# Patient Record
Sex: MALE | Race: WHITE | Employment: FULL TIME | URBAN - METROPOLITAN AREA
[De-identification: names, ages, dates, MRNs, and addresses within clinical notes are randomized per-mention and may not be internally consistent; named-entity substitution may affect disease eponyms.]

---

## 2020-02-11 ENCOUNTER — HOSPITAL ENCOUNTER (INPATIENT)
Age: 50
LOS: 2 days | Discharge: HOME | DRG: 309 | End: 2020-02-13
Attending: EMERGENCY MEDICINE | Admitting: INTERNAL MEDICINE
Payer: COMMERCIAL

## 2020-02-11 ENCOUNTER — APPOINTMENT (OUTPATIENT)
Dept: GENERAL RADIOLOGY | Age: 50
DRG: 309 | End: 2020-02-11
Payer: COMMERCIAL

## 2020-02-11 PROBLEM — F10.10 ALCOHOL ABUSE: Status: ACTIVE | Noted: 2020-02-11

## 2020-02-11 PROBLEM — I48.91 NEW ONSET A-FIB (HCC): Status: ACTIVE | Noted: 2020-02-11

## 2020-02-11 LAB
A/G RATIO: 1.4 (ref 1.1–2.2)
ALBUMIN SERPL-MCNC: 4.2 G/DL (ref 3.4–5)
ALP BLD-CCNC: 84 U/L (ref 40–129)
ALT SERPL-CCNC: 31 U/L (ref 10–40)
ANION GAP SERPL CALCULATED.3IONS-SCNC: 14 MMOL/L (ref 3–16)
AST SERPL-CCNC: 21 U/L (ref 15–37)
BILIRUB SERPL-MCNC: 0.7 MG/DL (ref 0–1)
BUN BLDV-MCNC: 12 MG/DL (ref 7–20)
CALCIUM SERPL-MCNC: 9.4 MG/DL (ref 8.3–10.6)
CHLORIDE BLD-SCNC: 97 MMOL/L (ref 99–110)
CO2: 25 MMOL/L (ref 21–32)
CREAT SERPL-MCNC: 0.8 MG/DL (ref 0.9–1.3)
ETHANOL: NORMAL MG/DL (ref 0–0.08)
GFR AFRICAN AMERICAN: >60
GFR NON-AFRICAN AMERICAN: >60
GLOBULIN: 3 G/DL
GLUCOSE BLD-MCNC: 357 MG/DL (ref 70–99)
HCT VFR BLD CALC: 51.8 % (ref 40.5–52.5)
HEMOGLOBIN: 18 G/DL (ref 13.5–17.5)
MAGNESIUM: 1.9 MG/DL (ref 1.8–2.4)
MCH RBC QN AUTO: 30 PG (ref 26–34)
MCHC RBC AUTO-ENTMCNC: 34.7 G/DL (ref 31–36)
MCV RBC AUTO: 86.5 FL (ref 80–100)
PDW BLD-RTO: 12.5 % (ref 12.4–15.4)
PLATELET # BLD: 271 K/UL (ref 135–450)
PMV BLD AUTO: 8.4 FL (ref 5–10.5)
POTASSIUM SERPL-SCNC: 4.1 MMOL/L (ref 3.5–5.1)
RBC # BLD: 5.99 M/UL (ref 4.2–5.9)
SODIUM BLD-SCNC: 136 MMOL/L (ref 136–145)
TOTAL PROTEIN: 7.2 G/DL (ref 6.4–8.2)
TROPONIN: <0.01 NG/ML
TSH REFLEX: 1.83 UIU/ML (ref 0.27–4.2)
WBC # BLD: 8.6 K/UL (ref 4–11)

## 2020-02-11 PROCEDURE — 6370000000 HC RX 637 (ALT 250 FOR IP): Performed by: INTERNAL MEDICINE

## 2020-02-11 PROCEDURE — 1200000000 HC SEMI PRIVATE

## 2020-02-11 PROCEDURE — 96365 THER/PROPH/DIAG IV INF INIT: CPT

## 2020-02-11 PROCEDURE — G0480 DRUG TEST DEF 1-7 CLASSES: HCPCS

## 2020-02-11 PROCEDURE — 2580000003 HC RX 258: Performed by: INTERNAL MEDICINE

## 2020-02-11 PROCEDURE — 85027 COMPLETE CBC AUTOMATED: CPT

## 2020-02-11 PROCEDURE — 80053 COMPREHEN METABOLIC PANEL: CPT

## 2020-02-11 PROCEDURE — 99291 CRITICAL CARE FIRST HOUR: CPT

## 2020-02-11 PROCEDURE — 84443 ASSAY THYROID STIM HORMONE: CPT

## 2020-02-11 PROCEDURE — 83735 ASSAY OF MAGNESIUM: CPT

## 2020-02-11 PROCEDURE — 96376 TX/PRO/DX INJ SAME DRUG ADON: CPT

## 2020-02-11 PROCEDURE — 2580000003 HC RX 258: Performed by: EMERGENCY MEDICINE

## 2020-02-11 PROCEDURE — 6360000002 HC RX W HCPCS: Performed by: INTERNAL MEDICINE

## 2020-02-11 PROCEDURE — 2500000003 HC RX 250 WO HCPCS: Performed by: INTERNAL MEDICINE

## 2020-02-11 PROCEDURE — 36415 COLL VENOUS BLD VENIPUNCTURE: CPT

## 2020-02-11 PROCEDURE — 84484 ASSAY OF TROPONIN QUANT: CPT

## 2020-02-11 PROCEDURE — 2500000003 HC RX 250 WO HCPCS: Performed by: EMERGENCY MEDICINE

## 2020-02-11 PROCEDURE — 71046 X-RAY EXAM CHEST 2 VIEWS: CPT

## 2020-02-11 PROCEDURE — 93005 ELECTROCARDIOGRAM TRACING: CPT | Performed by: EMERGENCY MEDICINE

## 2020-02-11 RX ORDER — ASPIRIN 81 MG/1
81 TABLET, CHEWABLE ORAL DAILY
Status: DISCONTINUED | OUTPATIENT
Start: 2020-02-11 | End: 2020-02-12

## 2020-02-11 RX ORDER — 0.9 % SODIUM CHLORIDE 0.9 %
1000 INTRAVENOUS SOLUTION INTRAVENOUS ONCE
Status: COMPLETED | OUTPATIENT
Start: 2020-02-11 | End: 2020-02-11

## 2020-02-11 RX ORDER — M-VIT,TX,IRON,MINS/CALC/FOLIC 27MG-0.4MG
1 TABLET ORAL DAILY
COMMUNITY

## 2020-02-11 RX ORDER — SODIUM CHLORIDE 9 MG/ML
INJECTION, SOLUTION INTRAVENOUS CONTINUOUS
Status: DISCONTINUED | OUTPATIENT
Start: 2020-02-11 | End: 2020-02-11

## 2020-02-11 RX ORDER — OMEPRAZOLE 10 MG/1
10 CAPSULE, DELAYED RELEASE ORAL DAILY
COMMUNITY

## 2020-02-11 RX ORDER — CHLORDIAZEPOXIDE HYDROCHLORIDE 5 MG/1
10 CAPSULE, GELATIN COATED ORAL 3 TIMES DAILY
Status: DISCONTINUED | OUTPATIENT
Start: 2020-02-11 | End: 2020-02-13 | Stop reason: HOSPADM

## 2020-02-11 RX ORDER — CHLORAL HYDRATE 500 MG
3000 CAPSULE ORAL 3 TIMES DAILY
COMMUNITY

## 2020-02-11 RX ORDER — SODIUM CHLORIDE 0.9 % (FLUSH) 0.9 %
10 SYRINGE (ML) INJECTION PRN
Status: DISCONTINUED | OUTPATIENT
Start: 2020-02-11 | End: 2020-02-13 | Stop reason: HOSPADM

## 2020-02-11 RX ORDER — SODIUM CHLORIDE 0.9 % (FLUSH) 0.9 %
10 SYRINGE (ML) INJECTION EVERY 12 HOURS SCHEDULED
Status: DISCONTINUED | OUTPATIENT
Start: 2020-02-11 | End: 2020-02-13 | Stop reason: HOSPADM

## 2020-02-11 RX ORDER — ONDANSETRON 2 MG/ML
4 INJECTION INTRAMUSCULAR; INTRAVENOUS EVERY 6 HOURS PRN
Status: DISCONTINUED | OUTPATIENT
Start: 2020-02-11 | End: 2020-02-13 | Stop reason: HOSPADM

## 2020-02-11 RX ORDER — ACETAMINOPHEN 325 MG/1
650 TABLET ORAL EVERY 4 HOURS PRN
Status: DISCONTINUED | OUTPATIENT
Start: 2020-02-11 | End: 2020-02-13 | Stop reason: HOSPADM

## 2020-02-11 RX ORDER — DILTIAZEM HYDROCHLORIDE 5 MG/ML
15 INJECTION INTRAVENOUS ONCE
Status: COMPLETED | OUTPATIENT
Start: 2020-02-11 | End: 2020-02-11

## 2020-02-11 RX ADMIN — ASPIRIN 81 MG 81 MG: 81 TABLET ORAL at 21:36

## 2020-02-11 RX ADMIN — SODIUM CHLORIDE 1000 ML: 9 INJECTION, SOLUTION INTRAVENOUS at 14:45

## 2020-02-11 RX ADMIN — DILTIAZEM HYDROCHLORIDE 5 MG/HR: 5 INJECTION INTRAVENOUS at 15:39

## 2020-02-11 RX ADMIN — CHLORDIAZEPOXIDE HYDROCHLORIDE 10 MG: 5 CAPSULE ORAL at 21:36

## 2020-02-11 RX ADMIN — DILTIAZEM HYDROCHLORIDE 15 MG: 5 INJECTION INTRAVENOUS at 14:25

## 2020-02-11 RX ADMIN — Medication 10 ML: at 21:55

## 2020-02-11 RX ADMIN — FOLIC ACID: 5 INJECTION, SOLUTION INTRAMUSCULAR; INTRAVENOUS; SUBCUTANEOUS at 21:36

## 2020-02-11 RX ADMIN — ENOXAPARIN SODIUM 40 MG: 40 INJECTION SUBCUTANEOUS at 21:36

## 2020-02-11 NOTE — ED PROVIDER NOTES
HISTORY     Past Medical History:   Diagnosis Date    GERD (gastroesophageal reflux disease)          SURGICAL HISTORY     History reviewed. No pertinent surgical history. CURRENT MEDICATIONS       Current Discharge Medication List      CONTINUE these medications which have NOT CHANGED    Details   Omega-3 Fatty Acids (FISH OIL) 1000 MG CAPS Take 3,000 mg by mouth 3 times daily      Multiple Vitamins-Minerals (THERAPEUTIC MULTIVITAMIN-MINERALS) tablet Take 1 tablet by mouth daily      omeprazole (PRILOSEC) 10 MG delayed release capsule Take 10 mg by mouth daily             ALLERGIES     Pcn [penicillins]    FAMILY HISTORY     History reviewed. No pertinent family history.        SOCIAL HISTORY       Social History     Socioeconomic History    Marital status:      Spouse name: None    Number of children: None    Years of education: None    Highest education level: None   Occupational History    None   Social Needs    Financial resource strain: None    Food insecurity:     Worry: None     Inability: None    Transportation needs:     Medical: None     Non-medical: None   Tobacco Use    Smoking status: Never Smoker    Smokeless tobacco: Never Used   Substance and Sexual Activity    Alcohol use: Yes     Comment: 5-6 drinks/day    Drug use: None    Sexual activity: None   Lifestyle    Physical activity:     Days per week: None     Minutes per session: None    Stress: None   Relationships    Social connections:     Talks on phone: None     Gets together: None     Attends [employer] service: None     Active member of club or organization: None     Attends meetings of clubs or organizations: None     Relationship status: None    Intimate partner violence:     Fear of current or ex partner: None     Emotionally abused: None     Physically abused: None     Forced sexual activity: None   Other Topics Concern    None   Social History Narrative    None       SCREENINGS    Francisco Coma Scale  Eye signs or Co-signs this chart in the absence of a cardiologist.    The Ekg interpreted by me shows  atrial fibrillation with a rate of 165  Axis is   Normal  QTc is  407  Intervals and Durations are unremarkable. ST Segments: normal  No prior ekg    RADIOLOGY:   Non-plain film images such as CT, Ultrasound and MRI are read by the radiologist. Plain radiographic images are visualized and preliminarily interpreted by the emergency physician with the below findings:      Interpretation per the Radiologist below, if available at the time of this note:    XR CHEST STANDARD (2 VW)   Final Result   No acute cardiopulmonary disease.                ED BEDSIDE ULTRASOUND:   Performed by ED Physician - none    LABS:  Labs Reviewed   CBC - Abnormal; Notable for the following components:       Result Value    RBC 5.99 (*)     Hemoglobin 18.0 (*)     All other components within normal limits    Narrative:     Performed at:  [place] [address] [address]   Phone [phone]   COMPREHENSIVE METABOLIC PANEL - Abnormal; Notable for the following components:    Chloride 97 (*)     Glucose 357 (*)     CREATININE 0.8 (*)     All other components within normal limits    Narrative:     Performed at:  [place] [address] [address]   Phone [phone]   TROPONIN    Narrative:     Performed at:  [place] [address] [address]   Phone [phone]   ETHANOL    Narrative:     Performed at:  [place] [address] [address]   Phone [phone]   TROPONIN    Narrative:     Performed at:  [place] [address] [address]   Phone [phone]   TSH WITH REFLEX    Narrative:     Performed at:  [facility]  [address] [place] [id number]   Phone [phone]   MAGNESIUM    Narrative:     Performed at:  [facility]  [address] [place] [address],  [place], [facility] Brian [address]ado   Phone [phone]   TROPONIN   PROTIME-INR   BASIC METABOLIC PANEL   MAGNESIUM   HEMOGLOBIN A1C       All other labs were within normal range ornot returned as of this dictation. EMERGENCY DEPARTMENT COURSE and DIFFERENTIAL DIAGNOSIS/MDM:   Vitals:    Vitals:    02/12/20 0033 02/12/20 0045 02/12/20 0059 02/12/20 0116   BP: 114/82 123/80 (!) 126/90 119/81   Pulse: 80 91 81 75   Resp: 18 18 18 18   Temp: 98.5 °F (36.9 °C)      TempSrc: Oral      SpO2: 95% 96% 96% 95%   Weight:       Height:             MDM    ED COURSE/MDM    -Skylar Bernstein is a 47 y.o. male with no significant medical history presents to ED for palpitations. Seen by PCP today and was told to go to the ED as he had new onset A. fib on EKG. -On arrival patient tachycardic in the 160s though has been no complaints at this time.  -Patient seen and evaluated. Oldrecords reviewed. Workup was significant for elevated glucose of 357.  -This x-ray shows no acute cardiopulmonary disease  -Was given 15 mg of diltiazem with improvement to 130s. Was then put on an infusion.  -Labs and imaging reviewed and results discussed with patient.  -Plan for admission for further workup and treatment discussed with patient and family for new onset afib. Patient and family in agreement with plan and have nofurther questions/concerns      REASSESSMENT      Well appearing, non toxic, alert, oriented speaking in full sentences and hemodynamically stable upon discharge      CRITICAL CARE TIME   Total Critical Care time was 30 minutes, excluding separately reportableprocedures. There was a high probability of clinicallysignificant/life threatening deterioration in the patient's condition which required my urgent intervention.       CONSULTS:  IP CONSULT TO HOSPITALIST  IP CONSULT TO CARDIOLOGY    PROCEDURES:  Unless otherwise noted below, none     Procedures    FINAL IMPRESSION      1.  New onset a-fib [facility])          DISPOSITION/PLAN   DISPOSITION Admitted 02/11/2020 04:56:49 PM      PATIENT REFERREDTO:  Zaheer Landon MD  [address] [place] [address]  [phone]            DISCHARGE MEDICATIONS:  Current Discharge Medication List             (Please note that portions of this note were completed with a voice recognition program.  Efforts were made to edit the dictations but occasionally wordsare mis-transcribed.)    Anila Márquez MD (electronically signed)  Attending Emergency Physician            Anila Márquez MD  02/12/20 9470

## 2020-02-11 NOTE — ED NOTES
Pt to er with fluttery feeling in his chest.. feels like his heart is racing. . pt states he drinks 5-6 healthy size alcoholic beverages each night. On Thursday he drank a few more than usual and began having heart palpitations, he has tried to cut back on the drinking and caffinated beverages for the last few days and symptoms didn't resolve. He saw pcp today and he showed rapid heart rate and was told to come to er to be evaluated.  Lungs are clear bowel x4, pt denies nausea vomiting or David Astudillo, RN  02/11/20 5326

## 2020-02-12 LAB
ANION GAP SERPL CALCULATED.3IONS-SCNC: 10 MMOL/L (ref 3–16)
BUN BLDV-MCNC: 11 MG/DL (ref 7–20)
CALCIUM SERPL-MCNC: 8.7 MG/DL (ref 8.3–10.6)
CHLORIDE BLD-SCNC: 103 MMOL/L (ref 99–110)
CO2: 26 MMOL/L (ref 21–32)
CREAT SERPL-MCNC: 0.7 MG/DL (ref 0.9–1.3)
EKG ATRIAL RATE: 156 BPM
EKG DIAGNOSIS: NORMAL
EKG Q-T INTERVAL: 246 MS
EKG QRS DURATION: 82 MS
EKG QTC CALCULATION (BAZETT): 407 MS
EKG R AXIS: -17 DEGREES
EKG T AXIS: 34 DEGREES
EKG VENTRICULAR RATE: 165 BPM
ESTIMATED AVERAGE GLUCOSE: 306.3 MG/DL
GFR AFRICAN AMERICAN: >60
GFR NON-AFRICAN AMERICAN: >60
GLUCOSE BLD-MCNC: 304 MG/DL (ref 70–99)
GLUCOSE BLD-MCNC: 312 MG/DL (ref 70–99)
GLUCOSE BLD-MCNC: 331 MG/DL (ref 70–99)
GLUCOSE BLD-MCNC: 347 MG/DL (ref 70–99)
GLUCOSE BLD-MCNC: 356 MG/DL (ref 70–99)
HBA1C MFR BLD: 12.3 %
INR BLD: 1.07 (ref 0.86–1.14)
MAGNESIUM: 2 MG/DL (ref 1.8–2.4)
PERFORMED ON: ABNORMAL
POTASSIUM SERPL-SCNC: 4 MMOL/L (ref 3.5–5.1)
PROTHROMBIN TIME: 12.4 SEC (ref 10–13.2)
SODIUM BLD-SCNC: 139 MMOL/L (ref 136–145)
TROPONIN: <0.01 NG/ML

## 2020-02-12 PROCEDURE — 93010 ELECTROCARDIOGRAM REPORT: CPT | Performed by: INTERNAL MEDICINE

## 2020-02-12 PROCEDURE — 6370000000 HC RX 637 (ALT 250 FOR IP): Performed by: INTERNAL MEDICINE

## 2020-02-12 PROCEDURE — 1200000000 HC SEMI PRIVATE

## 2020-02-12 PROCEDURE — 83735 ASSAY OF MAGNESIUM: CPT

## 2020-02-12 PROCEDURE — 6360000002 HC RX W HCPCS: Performed by: INTERNAL MEDICINE

## 2020-02-12 PROCEDURE — 99255 IP/OBS CONSLTJ NEW/EST HI 80: CPT | Performed by: INTERNAL MEDICINE

## 2020-02-12 PROCEDURE — 80048 BASIC METABOLIC PNL TOTAL CA: CPT

## 2020-02-12 PROCEDURE — 2580000003 HC RX 258: Performed by: INTERNAL MEDICINE

## 2020-02-12 PROCEDURE — 83036 HEMOGLOBIN GLYCOSYLATED A1C: CPT

## 2020-02-12 PROCEDURE — 85610 PROTHROMBIN TIME: CPT

## 2020-02-12 PROCEDURE — 36415 COLL VENOUS BLD VENIPUNCTURE: CPT

## 2020-02-12 PROCEDURE — 2500000003 HC RX 250 WO HCPCS: Performed by: INTERNAL MEDICINE

## 2020-02-12 PROCEDURE — 6370000000 HC RX 637 (ALT 250 FOR IP): Performed by: REGISTERED NURSE

## 2020-02-12 RX ORDER — DEXTROSE MONOHYDRATE 50 MG/ML
100 INJECTION, SOLUTION INTRAVENOUS PRN
Status: DISCONTINUED | OUTPATIENT
Start: 2020-02-12 | End: 2020-02-13 | Stop reason: HOSPADM

## 2020-02-12 RX ORDER — GLIPIZIDE 5 MG/1
5 TABLET ORAL
Status: DISCONTINUED | OUTPATIENT
Start: 2020-02-13 | End: 2020-02-13 | Stop reason: HOSPADM

## 2020-02-12 RX ORDER — DEXTROSE MONOHYDRATE 25 G/50ML
12.5 INJECTION, SOLUTION INTRAVENOUS PRN
Status: DISCONTINUED | OUTPATIENT
Start: 2020-02-12 | End: 2020-02-13 | Stop reason: HOSPADM

## 2020-02-12 RX ORDER — NICOTINE POLACRILEX 4 MG
15 LOZENGE BUCCAL PRN
Status: DISCONTINUED | OUTPATIENT
Start: 2020-02-12 | End: 2020-02-13 | Stop reason: HOSPADM

## 2020-02-12 RX ORDER — METOPROLOL SUCCINATE 25 MG/1
25 TABLET, EXTENDED RELEASE ORAL EVERY 6 HOURS
Status: DISCONTINUED | OUTPATIENT
Start: 2020-02-12 | End: 2020-02-13

## 2020-02-12 RX ADMIN — APIXABAN 5 MG: 5 TABLET, FILM COATED ORAL at 12:37

## 2020-02-12 RX ADMIN — INSULIN LISPRO 6 UNITS: 100 INJECTION, SOLUTION INTRAVENOUS; SUBCUTANEOUS at 23:19

## 2020-02-12 RX ADMIN — INSULIN LISPRO 12 UNITS: 100 INJECTION, SOLUTION INTRAVENOUS; SUBCUTANEOUS at 19:12

## 2020-02-12 RX ADMIN — FOLIC ACID: 5 INJECTION, SOLUTION INTRAMUSCULAR; INTRAVENOUS; SUBCUTANEOUS at 23:14

## 2020-02-12 RX ADMIN — METOPROLOL SUCCINATE 25 MG: 25 TABLET, EXTENDED RELEASE ORAL at 23:20

## 2020-02-12 RX ADMIN — CHLORDIAZEPOXIDE HYDROCHLORIDE 10 MG: 5 CAPSULE ORAL at 14:22

## 2020-02-12 RX ADMIN — DILTIAZEM HYDROCHLORIDE 10 MG/HR: 5 INJECTION INTRAVENOUS at 14:22

## 2020-02-12 RX ADMIN — INSULIN LISPRO 5 UNITS: 100 INJECTION, SOLUTION INTRAVENOUS; SUBCUTANEOUS at 14:22

## 2020-02-12 RX ADMIN — DILTIAZEM HYDROCHLORIDE 10 MG/HR: 5 INJECTION INTRAVENOUS at 00:33

## 2020-02-12 RX ADMIN — Medication 10 ML: at 09:21

## 2020-02-12 RX ADMIN — METOPROLOL SUCCINATE 25 MG: 25 TABLET, EXTENDED RELEASE ORAL at 11:25

## 2020-02-12 RX ADMIN — APIXABAN 5 MG: 5 TABLET, FILM COATED ORAL at 20:23

## 2020-02-12 RX ADMIN — CHLORDIAZEPOXIDE HYDROCHLORIDE 10 MG: 5 CAPSULE ORAL at 20:23

## 2020-02-12 RX ADMIN — CHLORDIAZEPOXIDE HYDROCHLORIDE 10 MG: 5 CAPSULE ORAL at 09:20

## 2020-02-12 RX ADMIN — DILTIAZEM HYDROCHLORIDE 10 MG/HR: 5 INJECTION INTRAVENOUS at 23:25

## 2020-02-12 RX ADMIN — Medication 10 ML: at 20:23

## 2020-02-12 RX ADMIN — ASPIRIN 81 MG 81 MG: 81 TABLET ORAL at 09:20

## 2020-02-12 RX ADMIN — METOPROLOL SUCCINATE 25 MG: 25 TABLET, EXTENDED RELEASE ORAL at 16:24

## 2020-02-12 ASSESSMENT — PAIN SCALES - GENERAL
PAINLEVEL_OUTOF10: 0

## 2020-02-12 ASSESSMENT — ENCOUNTER SYMPTOMS
TROUBLE SWALLOWING: 0
NAUSEA: 0
COUGH: 0
RHINORRHEA: 0
DIARRHEA: 0
VOMITING: 0
SORE THROAT: 0
CHEST TIGHTNESS: 0
SHORTNESS OF BREATH: 0
CONSTIPATION: 0
BACK PAIN: 0
ABDOMINAL PAIN: 0

## 2020-02-12 NOTE — PLAN OF CARE
Assess BP and HR q 4 hours and prn. Educate pt on disease process, management, s/s to notify provider and risks associated with atrial fibrillation.

## 2020-02-12 NOTE — CARE COORDINATION
Writer met with pt to confirm he has no discharge needs. Pt lives at home with family, has [place], has PCP, works full time. Pt declined wanting any substance abuse resources, states he was waiting for a wake up call to stop drinking. Pt denies any discharge needs.   MINE Garcia-RN

## 2020-02-12 NOTE — H&P
insight  Capillary Refill: Brisk,< 3 seconds   Peripheral Pulses: +2 palpable, equal bilaterally       Labs:     Recent Labs     02/11/20  1350   WBC 8.6   HGB 18.0*   HCT 51.8        Recent Labs     02/11/20  1350      K 4.1   CL 97*   CO2 25   BUN 12   CREATININE 0.8*   CALCIUM 9.4     Recent Labs     02/11/20  1350   AST 21   ALT 31   BILITOT 0.7   ALKPHOS 84     No results for input(s): INR in the last 72 hours. Recent Labs     02/11/20  1350   TROPONINI <0.01       Radiology:     CXR: I have reviewed the CXR with the following interpretation: No acute process  EKG:  I have reviewed the EKG with the following interpretation: Atrial fibrillation, no acute ischemic changes, no prior EKGs to compare    XR CHEST STANDARD (2 VW)   Final Result   No acute cardiopulmonary disease. ASSESSMENT:  PLAN:    New onset a-fib   Likely due to alcoholism -alcohol binge last week. And excess caffeine intake.  -Rate controlled with Cardizem bolus and gtt. Initiated, titrated   -Monitor on telemetry  -Alcohol cessation counseling given  -2D echo, TSH, cardiology input  -Aspirin 81 mg daily  -Likely not a candidate for anticoagulation as this is A. fib secondary to alcoholism which usually abates with alcohol cessation    Chronic alcoholism with mild withdrawal  -Cessation counseling given, patient motivated to quit for good  -Rally pack x3 doses  -Librium 10 mg 3 times daily scheduled for mild withdrawal symptoms  -Monitor for severe withdrawal [still in window for DTs] and initiate CIWA protocol if need be    Hyperglycemia  - on arrival  -Check A1c  -Low-dose SSI    DVT Prophylaxis: lmwh  Diet: DIET GENERAL;  Code Status: Full Code    PT/OT Eval Status: na    Dispo - IP stay       Vern Wells MD    Thank you Evangelina Hernandez MD for the opportunity to be involved in this patient's care. If you have any questions or concerns please feel free to contact me at [phone].

## 2020-02-12 NOTE — CONSULTS
Nutrition Education    Type and Reason for Visit: Consult, Patient Education(DM diet education)    Nutrition Assessment:     Consult received for DM diet education. Noted hemoglobin A1c of 12.3% on 2/12. Pt reports that he was told in 2016 that his blood sugars were high (A1c was 7.9 at that time) and he tried to make some changes, but recently has not been \"being good\" d/t the holidays. Provided pt with written and verbal instruction on carb counting, label reading, and meal planning. Discussed limiting carb intakes to 4-5 carb choices per meal. Pt voiced understanding and reports that he is familiar with reading labels and what foods contain carbohydrates. MD to start pt on low dose SSI ACHS and glipizide. Changed diet order to carb control (5 carb choices/meal). Will continue to monitor per [facility] for any nutritional or education needs. · Verbally reviewed information with Patient  · Written educational materials provided. · Contact name and number provided. · Refer to Patient Education activity for more details.     Electronically signed by Wm Quach RD, MARGA on 2/12/20 at 2:22 PM    Contact Number: Office: [phone]; [address]: [address]

## 2020-02-12 NOTE — CONSULTS
(36.8 °C)   SpO2: 96%        Intake/Output Summary (Last 24 hours) at 2020 1004  Last data filed at 2020 0920  Gross per 24 hour   Intake 1902 ml   Output 0 ml   Net 1902 ml     In: 1902 [P. O.:680; I.V.:1222]  Out: 0    Wt Readings from Last 3 Encounters:   20 241 lb 1.6 oz (109.4 kg)     Temp  Av.3 °F (36.8 °C)  Min: 98.1 °F (36.7 °C)  Max: 98.5 °F (36.9 °C)  Pulse  Av.4  Min: 75  Max: 166  BP  Min: 110/75  Max: 146/104  SpO2  Av.4 %  Min: 94 %  Max: 99 %    · Telemetry: Atrial fibrillation with RVR   · Constitutional: Alert. Oriented to person, place, and time. No distress. · Head: Normocephalic and atraumatic. · Eyes: Conjunctivae normal. EOM are normal.   · Neck: Neck supple. No lymphadenopathy. No rigidity. No JVD present. · Cardiovascular: Irregular rate and rhythm without M/G/R.  · Pulmonary/Chest: Bilateral respiratory sounds present. No respiratory accessory muscle use. · Abdominal: Soft. Normal bowel sounds present. No distension, No tenderness. · Musculoskeletal: No tenderness. No edema    · Lymphadenopathy: Has no cervical adenopathy. · Neurological: Alert and oriented. Cranial nerve II-XII grossly intact. · Skin: Skin is warm and dry. No rash, lesions, ulcerations noted. · Psychiatric: No anxiety nor agitation. Labs:  Reviewed. Recent Labs     20  1350 20  0741    139   K 4.1 4.0   CL 97* 103   CO2 25 26   BUN 12 11   CREATININE 0.8* 0.7*     Recent Labs     20  1350   WBC 8.6   HGB 18.0*   HCT 51.8   MCV 86.5        Lab Results   Component Value Date    TROPONINI <0.01 2020     No results found for: BNP  Lab Results   Component Value Date    PROTIME 12.4 2020    INR 1.07 2020     No results found for: CHOL, HDL, TRIG    Diagnostic and imaging results reviewed. ECG: Atrial fibrillation with RVR without signs of ongoing ischemia  Echo: Pending. Cath: None.     I independently reviewed the ECG and telemetry. Scheduled Meds:   insulin lispro  0-6 Units Subcutaneous TID WC    insulin lispro  0-3 Units Subcutaneous Nightly    sodium chloride flush  10 mL Intravenous 2 times per day    aspirin  81 mg Oral Daily    enoxaparin  40 mg Subcutaneous Daily    folic acid, thiamine, multi-vitamin with vitamin K infusion   Intravenous Daily    chlordiazePOXIDE  10 mg Oral TID     Continuous Infusions:   dextrose      diltiazem (CARDIZEM) 125 mg in dextrose 5% 125 mL infusion 10 mg/hr (02/12/20 0835)     PRN Meds:.glucose, dextrose, glucagon (rDNA), dextrose, sodium chloride flush, magnesium hydroxide, ondansetron, perflutren lipid microspheres, acetaminophen     Assessment:   Patient Active Problem List    Diagnosis Date Noted    New onset a-fib ([facility] [address] 02/11/2020    Alcohol abuse 02/11/2020      Active Hospital Problems    Diagnosis Date Noted    New onset a-fib [facility]) [I48.91] 02/11/2020    Alcohol abuse [F10.10] 02/11/2020     Mr. Remy Villavicencio is a pleasant 47year old male with a medical history significant for diet controlled hypertension, and diabetes mellitus type II who presents from home with symptomatic atrial fibrillation with RVR. Problem List:  1. Atrial fibrillation with RVR. 2. Hypertension. 3. Diabetes mellitus type II. Assessment and Plan:  1. Atrial fibrillation with RVR. Mr. Remy Villavicencio is a pleasant 47year old male with a medical history significant for hypertension and diabetes mellitus type II who presents from home with palpitations and has now been found to have atrial fibrillation with RVR. Afib risk factors including age, HTN, obesity, inactivity and FIDELIA were discussed with patient. Risk factor modification recommended. Patient's [id number] score is 2 with a stroke risk of 2.2% per year stroke risk. We discussed oral anticoagulation to decrease the risk of thromboembolic events including stroke. Benefits and alternatives were discussed with patient.  Risk of

## 2020-02-12 NOTE — PROGRESS NOTES
glucose, dextrose, glucagon (rDNA), dextrose, sodium chloride flush, magnesium hydroxide, ondansetron, perflutren lipid microspheres, acetaminophen      Intake/Output Summary (Last 24 hours) at 2/12/2020 1322  Last data filed at 2/12/2020 0920  Gross per 24 hour   Intake 1902 ml   Output 0 ml   Net 1902 ml       Physical Exam Performed:    BP (!) 142/86   Pulse 92   Temp 98.2 °F (36.8 °C) (Oral)   Resp 16   Ht 6' 2\" (1.88 m)   Wt 241 lb 1.6 oz (109.4 kg)   SpO2 97%   BMI 30.96 kg/m²     General appearance: No apparent distress, appears stated age and cooperative. HEENT: Pupils equal, round, and reactive to light. Conjunctivae/corneas clear. Neck: Supple, with full range of motion. No jugular venous distention. Trachea midline. Respiratory:  Normal respiratory effort. Clear to auscultation, bilaterally without Rales/Wheezes/Rhonchi. Cardiovascular: Regular rate and rhythm with normal S1/S2 without murmurs, rubs or gallops. Abdomen: Soft, non-tender, non-distended with normal bowel sounds. Musculoskeletal: No clubbing, cyanosis or edema bilaterally. Full range of motion without deformity. Skin: Skin color, texture, turgor normal.  No rashes or lesions. Neurologic:  Neurovascularly intact without any focal sensory/motor deficits.  Cranial nerves: II-XII intact, grossly non-focal.  Psychiatric: Alert and oriented, thought content appropriate, normal insight  Capillary Refill: Brisk,< 3 seconds   Peripheral Pulses: +2 palpable, equal bilaterally       Labs:   Recent Labs     02/11/20  1350   WBC 8.6   HGB 18.0*   HCT 51.8        Recent Labs     02/11/20  1350 02/12/20  0741    139   K 4.1 4.0   CL 97* 103   CO2 25 26   BUN 12 11   CREATININE 0.8* 0.7*   CALCIUM 9.4 8.7     Recent Labs     02/11/20  1350   AST 21   ALT 31   BILITOT 0.7   ALKPHOS 84     Recent Labs     02/12/20  0741   INR 1.07     Recent Labs     02/11/20  1350 02/11/20  2356   TROPONINI <0.01 <0.01     Radiology:  XR CHEST STANDARD (2 VW)   Final Result   No acute cardiopulmonary disease. Assessment/Plan:    Active Hospital Problems    Diagnosis    New onset a-fib ([facility] [address] [I48.91]    Alcohol abuse [F10.10]       New onset atrial fibrillation:  - Likely due to alcoholism - alcohol binge last week and excess caffeine intake. - Alcohol cessation counseling given. - Pt initiated on cardizem infusion on admission.   - EP/Cardiology consulted -- appreciate. Started pt on metoprolol and apixaban. - Continue to monitor on telemetry.  - TSH WNL.   - ECHO ordered.      Chronic alcoholism with mild withdrawal:  - Cessation counseling given, patient motivated to quit for good. - Rally pack x3 doses. - Continue Librium 10 mg 3 times daily scheduled for mild withdrawal symptoms  - Monitor for severe withdrawal [still in window for DTs] and initiate CIWA protocol if need be.      Hyperglycemia:  - A1C was 7.9 in Sept 2016. Pt reports that he was started on Metformin but was only on it for a month due to GI intolerance/diarrhea. - Repeat A1C is 12.3.  - Will start pt on low dose SSI ACHS and glipizide. - Consult dietician to discuss carb-control diet.        DVT Prophylaxis: Pt is on Eliquis   Diet: DIET GENERAL;  Code Status: Full Code    PT/OT Eval Status: Not indicated     Dispo - 1-2 days     FAM Amado - CNP

## 2020-02-13 VITALS
HEIGHT: 74 IN | WEIGHT: 241.1 LBS | SYSTOLIC BLOOD PRESSURE: 126 MMHG | DIASTOLIC BLOOD PRESSURE: 88 MMHG | OXYGEN SATURATION: 97 % | RESPIRATION RATE: 16 BRPM | BODY MASS INDEX: 30.94 KG/M2 | TEMPERATURE: 98.3 F | HEART RATE: 93 BPM

## 2020-02-13 PROBLEM — I10 ESSENTIAL HYPERTENSION: Status: ACTIVE | Noted: 2020-02-13

## 2020-02-13 LAB
GLUCOSE BLD-MCNC: 238 MG/DL (ref 70–99)
GLUCOSE BLD-MCNC: 277 MG/DL (ref 70–99)
LV EF: 58 %
LVEF MODALITY: NORMAL
PERFORMED ON: ABNORMAL
PERFORMED ON: ABNORMAL

## 2020-02-13 PROCEDURE — 6360000004 HC RX CONTRAST MEDICATION: Performed by: INTERNAL MEDICINE

## 2020-02-13 PROCEDURE — 99233 SBSQ HOSP IP/OBS HIGH 50: CPT | Performed by: NURSE PRACTITIONER

## 2020-02-13 PROCEDURE — 6370000000 HC RX 637 (ALT 250 FOR IP): Performed by: REGISTERED NURSE

## 2020-02-13 PROCEDURE — C8929 TTE W OR WO FOL WCON,DOPPLER: HCPCS

## 2020-02-13 PROCEDURE — 6370000000 HC RX 637 (ALT 250 FOR IP): Performed by: INTERNAL MEDICINE

## 2020-02-13 PROCEDURE — 2580000003 HC RX 258: Performed by: INTERNAL MEDICINE

## 2020-02-13 PROCEDURE — 6370000000 HC RX 637 (ALT 250 FOR IP): Performed by: NURSE PRACTITIONER

## 2020-02-13 RX ORDER — METOPROLOL SUCCINATE 50 MG/1
100 TABLET, EXTENDED RELEASE ORAL DAILY
Qty: 30 TABLET | Refills: 3 | Status: SHIPPED | OUTPATIENT
Start: 2020-02-13 | End: 2020-07-06 | Stop reason: SDUPTHER

## 2020-02-13 RX ORDER — BLOOD-GLUCOSE METER
1 KIT MISCELLANEOUS DAILY
Qty: 1 KIT | Refills: 0 | Status: SHIPPED | OUTPATIENT
Start: 2020-02-13

## 2020-02-13 RX ORDER — METOPROLOL SUCCINATE 50 MG/1
50 TABLET, EXTENDED RELEASE ORAL 2 TIMES DAILY
Status: DISCONTINUED | OUTPATIENT
Start: 2020-02-13 | End: 2020-02-13 | Stop reason: HOSPADM

## 2020-02-13 RX ORDER — METOPROLOL SUCCINATE 25 MG/1
25 TABLET, EXTENDED RELEASE ORAL ONCE
Status: COMPLETED | OUTPATIENT
Start: 2020-02-13 | End: 2020-02-13

## 2020-02-13 RX ORDER — METOPROLOL SUCCINATE 50 MG/1
50 TABLET, EXTENDED RELEASE ORAL 2 TIMES DAILY
Qty: 30 TABLET | Refills: 3 | Status: SHIPPED | OUTPATIENT
Start: 2020-02-13 | End: 2020-02-13

## 2020-02-13 RX ORDER — DILTIAZEM HYDROCHLORIDE 180 MG/1
180 CAPSULE, COATED, EXTENDED RELEASE ORAL DAILY
Qty: 30 CAPSULE | Refills: 3 | Status: SHIPPED | OUTPATIENT
Start: 2020-02-14 | End: 2020-07-06 | Stop reason: SDUPTHER

## 2020-02-13 RX ORDER — DILTIAZEM HYDROCHLORIDE 180 MG/1
180 CAPSULE, COATED, EXTENDED RELEASE ORAL DAILY
Status: DISCONTINUED | OUTPATIENT
Start: 2020-02-13 | End: 2020-02-13 | Stop reason: HOSPADM

## 2020-02-13 RX ORDER — CHLORDIAZEPOXIDE HYDROCHLORIDE 10 MG/1
CAPSULE, GELATIN COATED ORAL
Qty: 9 CAPSULE | Refills: 0 | Status: SHIPPED | OUTPATIENT
Start: 2020-02-13 | End: 2020-02-17

## 2020-02-13 RX ORDER — GLIPIZIDE 5 MG/1
5 TABLET ORAL
Qty: 60 TABLET | Refills: 3 | Status: SHIPPED | OUTPATIENT
Start: 2020-02-14

## 2020-02-13 RX ADMIN — DILTIAZEM HYDROCHLORIDE 180 MG: 180 CAPSULE, COATED, EXTENDED RELEASE ORAL at 11:29

## 2020-02-13 RX ADMIN — CHLORDIAZEPOXIDE HYDROCHLORIDE 10 MG: 5 CAPSULE ORAL at 15:32

## 2020-02-13 RX ADMIN — METOPROLOL SUCCINATE 25 MG: 25 TABLET, EXTENDED RELEASE ORAL at 05:57

## 2020-02-13 RX ADMIN — CHLORDIAZEPOXIDE HYDROCHLORIDE 10 MG: 5 CAPSULE ORAL at 10:13

## 2020-02-13 RX ADMIN — PERFLUTREN 1.65 MG: 6.52 INJECTION, SUSPENSION INTRAVENOUS at 09:30

## 2020-02-13 RX ADMIN — Medication 10 ML: at 10:16

## 2020-02-13 RX ADMIN — INSULIN LISPRO 9 UNITS: 100 INJECTION, SOLUTION INTRAVENOUS; SUBCUTANEOUS at 15:25

## 2020-02-13 RX ADMIN — GLIPIZIDE 5 MG: 5 TABLET ORAL at 05:57

## 2020-02-13 RX ADMIN — APIXABAN 5 MG: 5 TABLET, FILM COATED ORAL at 10:13

## 2020-02-13 RX ADMIN — INSULIN LISPRO 6 UNITS: 100 INJECTION, SOLUTION INTRAVENOUS; SUBCUTANEOUS at 10:13

## 2020-02-13 RX ADMIN — METOPROLOL SUCCINATE 25 MG: 25 TABLET, EXTENDED RELEASE ORAL at 11:29

## 2020-02-13 ASSESSMENT — PAIN SCALES - GENERAL
PAINLEVEL_OUTOF10: 0

## 2020-02-13 NOTE — PLAN OF CARE
Monitor BG levels ACHS and prn. Educate pt and family on disease process; management including medications and diet; s/s of hypoglycemia and hyperglycemia.

## 2020-02-13 NOTE — PROGRESS NOTES
[address]     Electrophysiology                                     Progress Note    Admission date:  2020    Reason for follow up visit: afib    HPI/CC: Gladys Nails was admitted on 2020 with palpitations after consuming a large quantity of bourbon. EKG showed afib with a HR of 165. Echo is pending. Rhythm has been afib with HR . Subjective: He complains of occasional palpitations. Wants to go home. Denies chest pain, shortness of breath, and dizziness. Vitals:  Blood pressure 126/85, pulse 84, temperature 98.1 °F (36.7 °C), temperature source Oral, resp. rate 16, height 6' 2\" (1.88 m), weight 241 lb 1.6 oz (109.4 kg), SpO2 94 %.   Temp  Av.1 °F (36.7 °C)  Min: 97.7 °F (36.5 °C)  Max: 98.4 °F (36.9 °C)  Pulse  Av.7  Min: 84  Max: 130  BP  Min: 124/90  Max: 152/70  SpO2  Av.2 %  Min: 94 %  Max: 97 %    24 hour I/O    Intake/Output Summary (Last 24 hours) at 2020 0837  Last data filed at 2020 2149  Gross per 24 hour   Intake 2444 ml   Output 0 ml   Net 2444 ml     Current Facility-Administered Medications   Medication Dose Route Frequency Provider Last Rate Last Dose    glucose (GLUTOSE) 40 % oral gel 15 g  15 g Oral PRN Shelley Jonese Dial, APRN - CNP        dextrose 50 % IV solution  12.5 g Intravenous PRN FAM Paula - CNP        glucagon (rDNA) injection 1 mg  1 mg Intramuscular PRN Shelley Negrondne Dial, APRN - CNP        dextrose 5 % solution  100 mL/hr Intravenous PRN Shelley Negrondne Dial, APRN - CNP        metoprolol succinate (TOPROL XL) extended release tablet 25 mg  25 mg Oral Q6H Sparkle Sepulveda MD   25 mg at 20    apixaban (ELIQUIS) tablet 5 mg  5 mg Oral BID Sparkle Sepulveda MD   5 mg at 20    glipiZIDE (GLUCOTROL) tablet 5 mg  5 mg Oral QAM AC Shelley Cydne Dial, APRN - CNP   5 mg at 20 0557    insulin lispro (HUMALOG) injection vial 0-18 Units  0-18 Units Subcutaneous TID WC Daryn Perera, APRN - CNP   12 Units at 02/12/20 1912    insulin lispro (HUMALOG) injection vial 0-9 Units  0-9 Units Subcutaneous Nightly Shelley Tomer Souza, APRN - CNP   6 Units at 02/12/20 2319    sodium chloride flush 0.9 % injection 10 mL  10 mL Intravenous 2 times per day Dellis Angelucci, MD   10 mL at 02/12/20 2023    sodium chloride flush 0.9 % injection 10 mL  10 mL Intravenous PRN Dellis Angelucci, MD        magnesium hydroxide (MILK OF MAGNESIA) 400 MG/5ML suspension 30 mL  30 mL Oral Daily PRN Dellis Angelucci, MD        ondansetron [facility]) injection 4 mg  4 mg Intravenous Q6H PRN Dellis Angelucci, MD        perflutren lipid microspheres (DEFINITY) injection 1.65 mg  1.5 mL Intravenous ONCE PRN Dellis Angelucci, MD        diltiazem 125 mg in dextrose 5 % 125 mL infusion  5 mg/hr Intravenous Continuous Dellis Angelucci, MD 2.5 mL/hr at 02/13/20 0312 2.5 mg/hr at 02/13/20 0129    acetaminophen (TYLENOL) tablet 650 mg  650 mg Oral Q4H PRN Dellis Angelucci, MD        sodium chloride 0.9 % 4,246 mL with folic acid 1 mg, adult multi-vitamin with vitamin k 10 mL, thiamine 100 mg   Intravenous Daily Dellis Angelucci,  mL/hr at 02/12/20 2314      chlordiazePOXIDE (LIBRIUM) capsule 10 mg  10 mg Oral TID Dellis Angelucci, MD   10 mg at 02/12/20 2023       Objective:     Telemetry monitor: afib    Physical Exam:  Constitutional and general appearance: alert, cooperative, no distress and appears stated age  HEENT: PERRL, no cervical lymphadenopathy. No masses palpable.  Normal oral mucosa  Respiratory:  · Normal excursion and expansion without use of accessory muscles  · Resp auscultation: Normal breath sounds without wheezing, rhonchi, and rales  Cardiovascular:  · The apical impulse is not displaced  · Heart tones are crisp and normal. irregular S1 and S2.  · Jugular venous pulsation Normal  · The carotid upstroke is normal in amplitude and contour without delay

## 2020-02-13 NOTE — PROGRESS NOTES
Patient discharged home with wife transporting. Discharge instructions explained and given to patient along with prescriptions. IV removed. No complications. Telebox removed and returned. Patient belongings gone over and accounted for. Patient to taken to car via ambulation by wife.

## 2020-02-13 NOTE — PROGRESS NOTES
Monitor placed by Mindy Magallanes-[employer]  Length of monitor 14 days  Monitor ordered by Airam Kenny MD  Monitor number [id number]  Unlock number [id number]  Activation successful prior to pt leaving hospital? No. He would like to activate the monitor after he gets home and showers. Fadia Lindsey patient's nurse also notified. [employer] event monitor placed on the patient. Instructions given to patient and his wife. Both verbalized understanding. All questions answered to the best of my ability.

## 2020-02-13 NOTE — DISCHARGE SUMMARY
started on Toprol  mg daily and Cardizem 180 mg daily and Eliquis 5 mg BID.   - Cardiology placed pt on 2 week cardiac event monitor post discharge. - He is to follow-up with EP Dr. Manny Amezcua in 4 weeks. Possible plan for outpt cardioversion if pt remains in afib after 4 weeks of uninterrupted AC.   - TSH WNL.   - ECHO obtained and showed technically difficult study, EF grossly normal at 55-60%, RV not well seen but appears mod enlarged with reduction function, normal LVDFP.      Chronic alcoholism with mild withdrawal on admission:  - Cessation counseling given, patient motivated to quit for good. - Pt started on Librium 10 mg TID, will taper at discharge. Pt advised to not drink at all while taking Librium, he verbalized understanding.   - Received rally pack while inpt.      Hyperglycemia due to diabetes mellitus type 2:  - His A1C was 7.9 in Sept 2016. Pt reports that he was started on Metformin but was only on it for a month due to GI intolerance/diarrhea. - Repeat A1C is 12.3.  - Pt started on glipizide. He is to have close follow-up with his PCP. Pt provided glucose meter at home and advised to record his blood sugar readings to PCP.   - Dietician consulted for education regarding carb-control diet. Possible FIDELIA:  - Outpatient sleep medicine evaluation referral placed per EP. Physical Exam Performed:     /88   Pulse 93   Temp 98.3 °F (36.8 °C) (Oral)   Resp 16   Ht 6' 2\" (1.88 m)   Wt 241 lb 1.6 oz (109.4 kg)   SpO2 97%   BMI 30.96 kg/m²       General appearance:  No apparent distress, appears stated age and cooperative. HEENT:  Normal cephalic, atraumatic without obvious deformity. Pupils equal, round, and reactive to light. Extra ocular muscles intact. Conjunctivae/corneas clear. Neck: Supple, with full range of motion. No jugular venous distention. Trachea midline. Respiratory:  Normal respiratory effort.  Clear to auscultation, bilaterally without Rales/Wheezes/Rhonchi. Cardiovascular:  Irregular rate and rhythm with normal S1/S2 without murmurs, rubs or gallops. Abdomen: Soft, non-tender, non-distended with normal bowel sounds. Musculoskeletal:  No clubbing, cyanosis or edema bilaterally. Full range of motion without deformity. Skin: Skin color, texture, turgor normal.  No rashes or lesions. Neurologic:  Neurovascularly intact without any focal sensory/motor deficits. Cranial nerves: II-XII intact, grossly non-focal.  Psychiatric:  Alert and oriented, thought content appropriate, normal insight  Capillary Refill: Brisk,< 3 seconds   Peripheral Pulses: +2 palpable, equal bilaterally       Labs: For convenience and continuity at follow-up the following most recent labs are provided:      CBC:    Lab Results   Component Value Date    WBC 8.6 02/11/2020    HGB 18.0 02/11/2020    HCT 51.8 02/11/2020     02/11/2020       Renal:    Lab Results   Component Value Date     02/12/2020    K 4.0 02/12/2020     02/12/2020    CO2 26 02/12/2020    BUN 11 02/12/2020    CREATININE 0.7 02/12/2020    CALCIUM 8.7 02/12/2020         Significant Diagnostic Studies    Radiology:   XR CHEST STANDARD (2 VW)   Final Result   No acute cardiopulmonary disease. Consults:     IP CONSULT TO HOSPITALIST  IP CONSULT TO CARDIOLOGY  IP CONSULT TO DIETITIAN    Disposition:  Home     Condition at Discharge: Stable    Discharge Instructions/Follow-up:  Follow-up with PCP and EP    Code Status:  Full Code     Activity: activity as tolerated    Diet: diabetic diet      Discharge Medications:     Current Discharge Medication List           Details   chlordiazePOXIDE (LIBRIUM) 10 MG capsule Take 1 capsule 3 times per day for 1 day, then take 1 capsule 2 times per day for 2 days, then take 1 capsule 1 time per day for 2 days.   Qty: 9 capsule, Refills: 0    Associated Diagnoses: Alcohol abuse      apixaban (ELIQUIS) 5 MG TABS tablet Take 1 tablet by mouth 2 times

## 2020-02-13 NOTE — PLAN OF CARE
Assess BP q 4 hours and prn. Educate pt on disease process, management, s/s to notify provider and risks associated with atrial fibrillation.

## 2020-02-17 ENCOUNTER — TELEPHONE (OUTPATIENT)
Dept: CARDIOLOGY CLINIC | Age: 50
End: 2020-02-17

## 2020-02-17 NOTE — TELEPHONE ENCOUNTER
Call received from monitor company. The are faxing over urgent report. Report given to Dr. Palmira Harada to review.

## 2020-02-18 ENCOUNTER — TELEPHONE (OUTPATIENT)
Dept: CARDIOLOGY CLINIC | Age: 50
End: 2020-02-18

## 2020-02-18 NOTE — TELEPHONE ENCOUNTER
Callie is calling and states patient had another episode of AFIB. Greater than 140 beats per minute, less than 200 for greater than 1 hour. She states it was published and faxed over.

## 2020-02-19 NOTE — TELEPHONE ENCOUNTER
Pt called back on answering service returning our call.  pls call him at either [phone] or [phone] to advise Thank you

## 2020-02-19 NOTE — TELEPHONE ENCOUNTER
Please call patient and schedule him to see [address] [facility] this Thursday or Tuesday, per [address] [facility] request. Thanks!

## 2020-02-24 NOTE — PROGRESS NOTES
[facility], [place], operated by [facility]   Electrophysiology Note      Reason for office visit: Follow up- atrial fibrillation    HISTORY OF PRESENT ILLNESS: Gladys Nails is a 47 y.o. male who presents for follow up for atrial fibrillation. He has a PMH for diet controlled hypertension and diabetes. He presented to his PCP for palpitations and found to have atrial fibrillation with RVR and was sent to ED. He stated he had been drinking more than usual the night before. He stated he drinks daily. In the ED at [facility], he was started on Cardizem drip and also insulin due to hyperglycemia. Echo on 2/13/20 showed EF of 55-60%, RV not well seen but appears mod enlarged with reduction function, normal LVDFP. He was discharged on Toprol XL, cardizem and apixaban. A 2 week monitor was also placed on discharge. It is not resulted as of yet. On 2/18/20, we received a phone call that he had another episode of afib and appointment was made. Today he states he is doing great. He is taking his medications as prescribed. He states he is tolerating them. Today his EKG shows SR 79 bpm.  He has been taking his blood pressures and heart rate at home. He states he has been exercising on his elliptical .  He states that he has stopped drinking also. He thinks that he was in withdrawal from alcohol when his last episode of AFib occurred. He sleeps well now. Past Medical History:   has a past medical history of GERD (gastroesophageal reflux disease). Past Surgical History:   has no past surgical history on file. Social History:   reports that he has never smoked. He has never used smokeless tobacco. He reports previous alcohol use. Family History: family history is not on file. Allergies:  Pcn [penicillins]    Home Medications:    Prior to Admission medications    Medication Sig Start Date End Date Taking?  Authorizing Provider   apixaban (ELIQUIS) 5 MG TABS tablet Take 1 tablet by mouth 2 times daily 2/13/20 4/13/20 Yes [facility] CNP   glipiZIDE (GLUCOTROL) 5 MG tablet Take 1 tablet by mouth every morning (before breakfast) 2/14/20  Yes [facility] CNP   diltiazem (CARDIZEM CD) 180 MG extended release capsule Take 1 capsule by mouth daily 2/14/20  Yes [facility] CNP   glucose monitoring kit (FREESTYLE) monitoring kit 1 kit by Does not apply route daily 2/13/20  Yes [facility] CNP   metoprolol succinate (TOPROL XL) 50 MG extended release tablet Take 2 tablets by mouth daily 2/13/20  Yes [facility] CNP   Omega-3 Fatty Acids (FISH OIL) 1000 MG CAPS Take 3,000 mg by mouth 3 times daily   Yes Historical Provider, MD   Multiple Vitamins-Minerals (THERAPEUTIC MULTIVITAMIN-MINERALS) tablet Take 1 tablet by mouth daily   Yes Historical Provider, MD   omeprazole (PRILOSEC) 10 MG delayed release capsule Take 10 mg by mouth daily   Yes Historical Provider, MD       REVIEW OF SYSTEMS:      All 14-point review of systems are completed and pertinent positives are mentioned in the history of present illness. Other systems are reviewed and are negative. Physical Examination:    /66   Pulse 84   Ht 6' 2\" (1.88 m)   Wt 240 lb (108.9 kg)   SpO2 95%   BMI 30.81 kg/m²    Constitutional and General Appearance: alert, cooperative, no distress and appears stated age  [de-identified]: PERRL, no cervical lymphadenopathy. No masses palpable. Normal oral mucosa  Respiratory:  · Normal excursion and expansion without use of accessory muscles  · Resp Auscultation: Normal breath sounds without dullness or wheezing  Cardiovascular:  · The apical impulse is not displaced  · Heart tones are crisp and normal. regular S1 and S2.  · Jugular venous pulsation Normal  Abdomen:  · No masses or tenderness  · Bowel sounds present  Extremities:  ·  No Cyanosis or Clubbing  ·  Lower extremity edema: No  · Skin: Warm and dry  Neurological:  · Alert and oriented.   · Moves all rhythm. He is tolerating his anticoagulation and rate control agents. We discussed rate control, rhythm control, AVN + pacemaker, and atrial fibrillation ablation. We reviewed risks and benefits of each approach. We discussed side effects of class IC, class II, class III, and class IV antiarrhythmics. All questions were answered. This point he would like to continue with anticoagulation and rate control strategy. He will try and change his behaviors including drinking and weight loss in order to prevent future atrial fibrillation with RVR episodes. ~[id number] Score 1 (HTN)    2. Alcohol abuse   ~he reports he has quit    RECOMMENDATIONS:  1.  Rate control (metorprolol) vs rhythm control Amiodarone (lots of long term side effects), Tikosyn ( 3 day admission to monitor EKG changes), multaq ( upset stomach and does not convert you back to sinus rhythm),or Flecainide. Ablations discussed. 2.  Recommend Flecainide to take as needed for afib symptoms. No more than 300mg daily. He would like to wait on this to see how he feels since he has stopped drinking and started exercising. You can call if you decide to start this. 3.  Follow up in 3 months    QUALITY MEASURES  1. Tobacco Cessation Counseling: NA  2. Retake of BP if >140/90:   NA  3. Documentation to PCP/referring for new patient:  Sent to PCP at close of office visit  4. CAD patient on anti-platelet: NA  5. CAD patient on STATIN therapy:  NA  6. Patient with CHF and aFib on anticoagulation:  Yes     This note was scribed in the presence of Vinita Gottron MD by Pilo Zelaya RN. All questions and concerns were addressed to the patient/family. Alternatives to my treatment were discussed. Dr. Vinita Gottron MD  Electrophysiology  [facility]. [address]. [address]. [facility], [address]  Phone: [phone]  Fax: [phone]   2/25/2020     NOTE: This report was transcribed using voice recognition software.  Every effort was made to ensure accuracy, however, inadvertent computerized transcription errors may be present. The scribe's documentation has been prepared under my direction and personally reviewed by me in its entirety. I confirm that the note above accurately reflects all work, physical examination, the discussion of treatments and procedures, and medical decision making performed by me. Michael Jackson MD personally performed the services described in this documentation as scribed by nurse in my presence, and is both accurate and complete.     Electronically signed by Jenna Leiva MD on 2/27/2020 at 9:51 AM

## 2020-02-25 ENCOUNTER — OFFICE VISIT (OUTPATIENT)
Dept: CARDIOLOGY CLINIC | Age: 50
End: 2020-02-25
Payer: COMMERCIAL

## 2020-02-25 VITALS
BODY MASS INDEX: 30.8 KG/M2 | WEIGHT: 240 LBS | OXYGEN SATURATION: 95 % | SYSTOLIC BLOOD PRESSURE: 106 MMHG | HEART RATE: 84 BPM | HEIGHT: 74 IN | DIASTOLIC BLOOD PRESSURE: 66 MMHG

## 2020-02-25 PROCEDURE — 99214 OFFICE O/P EST MOD 30 MIN: CPT | Performed by: INTERNAL MEDICINE

## 2020-02-25 PROCEDURE — 93000 ELECTROCARDIOGRAM COMPLETE: CPT | Performed by: INTERNAL MEDICINE

## 2020-02-25 RX ORDER — FLECAINIDE ACETATE 150 MG/1
300 TABLET ORAL DAILY PRN
Qty: 60 TABLET | Refills: 3 | Status: CANCELLED | OUTPATIENT
Start: 2020-02-25

## 2020-02-25 NOTE — PATIENT INSTRUCTIONS
a pneumococcal vaccine shot. If you have had one before, ask your doctor whether you need another dose. Get a flu shot every year. If you must be around people with colds or flu, wash your hands often. Activity    · If your doctor recommends it, get more exercise. Walking is a good choice. Bit by bit, increase the amount you walk every day. Try for at least 30 minutes on most days of the week. You also may want to swim, bike, or do other activities. Your doctor may suggest that you join a cardiac rehabilitation program so that you can have help increasing your physical activity safely.     · Start light exercise if your doctor says it is okay. Even a small amount will help you get stronger, have more energy, and manage stress. Walking is an easy way to get exercise. Start out by walking a little more than you did in the hospital. Gradually increase the amount you walk.     · When you exercise, watch for signs that your heart is working too hard. You are pushing too hard if you cannot talk while you are exercising. If you become short of breath or dizzy or have chest pain, sit down and rest immediately.     · Check your pulse regularly. Place two fingers on the artery at the palm side of your wrist, in line with your thumb. If your heartbeat seems uneven or fast, talk to your doctor. When should you call for help? Call 911 anytime you think you may need emergency care. For example, call if:    · You have symptoms of a heart attack. These may include:  ? Chest pain or pressure, or a strange feeling in the chest.  ? Sweating. ? Shortness of breath. ? Nausea or vomiting. ? Pain, pressure, or a strange feeling in the back, neck, jaw, or upper belly or in one or both shoulders or arms. ? Lightheadedness or sudden weakness. ? A fast or irregular heartbeat. After you call  911, the  may tell you to chew 1 adult-strength or 2 to 4 low-dose aspirin. Wait for an ambulance.  Do not try to drive yourself.     · You have symptoms of a stroke. These may include:  ? Sudden numbness, tingling, weakness, or loss of movement in your face, arm, or leg, especially on only one side of your body. ? Sudden vision changes. ? Sudden trouble speaking. ? Sudden confusion or trouble understanding simple statements. ? Sudden problems with walking or balance. ? A sudden, severe headache that is different from past headaches.     · You passed out (lost consciousness).    Call your doctor now or seek immediate medical care if:    · You have new or increased shortness of breath.     · You feel dizzy or lightheaded, or you feel like you may faint.     · Your heart rate becomes irregular.     · You can feel your heart flutter in your chest or skip heartbeats. Tell your doctor if these symptoms are new or worse.    Watch closely for changes in your health, and be sure to contact your doctor if you have any problems. Where can you learn more? Go to https://[employer].LonoCloud. org and sign in to your [employer] account. Enter U020 in the [employer] box to learn more about \"Atrial Fibrillation: Care Instructions. \"     If you do not have an account, please click on the \"Sign Up Now\" link. Current as of: April 9, 2019  Content Version: 12.3  © 7459-0816 Healthwise, [employer]. Care instructions adapted under license by [facility] ([facility]). If you have questions about a medical condition or this instruction, always ask your healthcare professional. [place] [address] any warranty or liability for your use of this information. Patient Education        Deciding Between Electrical Cardioversion and Rate Control Medicines for Atrial Fibrillation  How can you decide between electrical cardioversion and rate control medicines for atrial fibrillation? What is atrial fibrillation? Atrial fibrillation (say \"AY-tree-ariella [id number]-[id number]-TMW-shun\") is a kind of uneven heartbeat.  It can make you feel lightheaded and to take them as long. Why might you choose rate-control medicines? · These medicines keep many people from having symptoms. · You prefer to take medicines rather than have an electric shock. · Your symptoms don't bother you much. · If these medicines don't work, you can still try electrical cardioversion. Your decision  Thinking about the facts and your feelings can help you make a decision that is right for you. Be sure you understand the benefits and risks of your options. And think about what else you need to do before you make the decision. Where can you learn more? Go to https://Xingyun.cnpeDesall.Posterbee. org and sign in to your [employer] account. Enter N098 in the [facility] box to learn more about \"Deciding Between Electrical Cardioversion and Rate Control Medicines for Atrial Fibrillation. \"     If you do not have an account, please click on the \"Sign Up Now\" link. Current as of: April 9, 2019  Content Version: 12.3  © 0470-7442 [employer]. Care instructions adapted under license by [facility] ([facility]). If you have questions about a medical condition or this instruction, always ask your healthcare professional. [place] [address] any warranty or liability for your use of this information. RECOMMENDATIONS:  1.  Rate control (metorprolol) vs rhythm control Amiodarone (lots of long term side effects), Tikosyn ( 3 day admission to monitor EKG changes), multaq ( upset stomach and does not convert you back to sinus rhythm),or Flecainide. Ablations discussed. 2.  Recommend Flecainide to take as needed for afib symptoms. No more than 300mg daily. He would like to wait on this to see how he feels since he has stopped drinking and started exercising. You can call if you decide to start this.     3.  Follow up in 3 months

## 2020-02-25 NOTE — LETTER
[address]   Electrophysiology Note      Reason for office visit: Follow up- atrial fibrillation    HISTORY OF PRESENT ILLNESS: Aaliyah Do is a 47 y.o. male who presents for follow up for atrial fibrillation. He has a PMH for diet controlled hypertension and diabetes. He presented to his PCP for palpitations and found to have atrial fibrillation with RVR and was sent to ED. He stated he had been drinking more than usual the night before. He stated he drinks daily. In the ED at [facility], he was started on Cardizem drip and also insulin due to hyperglycemia. Echo on 2/13/20 showed EF of 55-60%, RV not well seen but appears mod enlarged with reduction function, normal LVDFP. He was discharged on Toprol XL, cardizem and apixaban. A 2 week monitor was also placed on discharge. It is not resulted as of yet. On 2/18/20, we received a phone call that he had another episode of afib and appointment was made. Today he states he is doing great. He is taking his medications as prescribed. He states he is tolerating them. Today his EKG shows SR 79 bpm.  He has been taking his blood pressures and heart rate at home. He states he has been exercising on his elliptical .  He states that he has stopped drinking also. He thinks that he was in withdrawal from alcohol when his last episode of AFib occurred. He sleeps well now. Past Medical History:   has a past medical history of GERD (gastroesophageal reflux disease). Past Surgical History:   has no past surgical history on file. Social History:   reports that he has never smoked. He has never used smokeless tobacco. He reports previous alcohol use. Family History: family history is not on file. Allergies:  Pcn [penicillins]    Home Medications:    Prior to Admission medications    Medication Sig Start Date End Date Taking?  Authorizing Provider   apixaban (ELIQUIS) 5 MG TABS tablet Take 1 tablet by mouth 2 times daily 2/13/20 4/13/20 Yes [address] [place] [address], APRN - [facility]   glipiZIDE (GLUCOTROL) 5 MG tablet Take 1 tablet by mouth every morning (before breakfast) 2/14/20  Yes [address] [place] [address], APRN - CNP   diltiazem (CARDIZEM CD) 180 MG extended release capsule Take 1 capsule by mouth daily 2/14/20  Yes [address] [place] [address], APRN - [facility]   glucose monitoring kit (FREESTYLE) monitoring kit 1 kit by Does not apply route daily 2/13/20  Yes [address] [place] [address], APRN - [facility]   metoprolol succinate (TOPROL XL) 50 MG extended release tablet Take 2 tablets by mouth daily 2/13/20  Yes [address] [place] [address], APRN - CNP   Omega-3 Fatty Acids (FISH OIL) 1000 MG CAPS Take 3,000 mg by mouth 3 times daily   Yes Historical Provider, MD   Multiple Vitamins-Minerals (THERAPEUTIC MULTIVITAMIN-MINERALS) tablet Take 1 tablet by mouth daily   Yes Historical Provider, MD   omeprazole (PRILOSEC) 10 MG delayed release capsule Take 10 mg by mouth daily   Yes Historical Provider, MD       REVIEW OF SYSTEMS:      All 14-point review of systems are completed and pertinent positives are mentioned in the history of present illness. Other systems are reviewed and are negative. Physical Examination:    /66   Pulse 84   Ht 6' 2\" (1.88 m)   Wt 240 lb (108.9 kg)   SpO2 95%   BMI 30.81 kg/m²     Constitutional and General Appearance: alert, cooperative, no distress and appears stated age  [de-identified]: PERRL, no cervical lymphadenopathy. No masses palpable. Normal oral mucosa  Respiratory:  · Normal excursion and expansion without use of accessory muscles  · Resp Auscultation: Normal breath sounds without dullness or wheezing  Cardiovascular:  · The apical impulse is not displaced  · Heart tones are crisp and normal. regular S1 and S2.  · Jugular venous pulsation Normal  Abdomen:  · No masses or tenderness  · Bowel sounds present  Extremities:  ·  No Cyanosis or Clubbing  ·  Lower extremity edema: No  · Skin: Warm and dry  Neurological:  · Alert and oriented. · Moves all extremities well  · No abnormalities of mood, affect, memory, mentation, or behavior are noted    DATA:    EC20  Atrial fibrillation with rapid ventricular response with premature ventricular or aberrantly conducted complexesInferior infarct , age undetermined    EC2020  Normal sinus rhythm with occasional PVCs. ECHO: 2020  Summary   Technically difficult examination. Difficult to access left ventricular systolic function due to arrhythmia but   appears grossly normal with an ejection fraction of 55 - 60 %. The right ventricle is not well seen, appears to be moderately enlarged with   reduced function. Left ventricular diastolic filling pressure is normal.   Mild mitral regurgitation. Systolic pulmonic artery pressure (SPAP) is normal estimated at 34 mmHg   (Right atrial pressure of 3 mmHg    [id number] Score for Atrial Fibrillation Stroke Risk   Risk   Factors  Component Value   C CHF No 0   H HTN Yes 1   A2 Age >= 76 No,  (47 y.o.) 0   D DM No 0   S2 Prior Stroke/TIA No 0   V Vascular Disease No 0   A Age 74-69 No,  (53 y.o.) 0   Sc Sex male 0    [id number]  Score  1   Score last updated  62:88 PM    Click here for a link to the UpToDate guideline \"Atrial Fibrillation: Anticoagulation therapy to prevent embolization    Disclaimer: Risk Score calculation is dependent on accuracy of patient problem list and past encounter diagnosis. IMPRESSION:    1.  New onset atrial fibrillation  2020  Mr. Vic Dove is a pleasant 47year old male with a medical history significant for hypertension and diabetes mellitus type II who presents from home with palpitations and has now been found to have atrial fibrillation with RVR. He was started on apixaban and metoprolol. He underwent an echocardiogram which showed normal left ventricular ejection fraction without significant valvular abnormalities.     2020 NOTE: This report was transcribed using voice recognition software. Every effort was made to ensure accuracy, however, inadvertent computerized transcription errors may be present. The scribe's documentation has been prepared under my direction and personally reviewed by me in its entirety. I confirm that the note above accurately reflects all work, physical examination, the discussion of treatments and procedures, and medical decision making performed by me. Shayla Rizo MD personally performed the services described in this documentation as scribed by nurse in my presence, and is both accurate and complete.     Electronically signed by Rony Ceja MD on 2/27/2020 at 9:51 AM

## 2020-03-04 PROCEDURE — 93272 ECG/REVIEW INTERPRET ONLY: CPT | Performed by: INTERNAL MEDICINE

## 2020-03-05 ENCOUNTER — TELEPHONE (OUTPATIENT)
Dept: NON INVASIVE DIAGNOSTICS | Age: 50
End: 2020-03-05

## 2020-03-05 NOTE — TELEPHONE ENCOUNTER
Monitor Results  Predominant rhythm: Atrial fibrillation  Arrhythmias: Atrial fibrillation with RVR  Recommendations: Follow up with me as soon as possible.     THALIA: BATSHEVA

## 2020-05-26 ENCOUNTER — TELEPHONE (OUTPATIENT)
Dept: CARDIOLOGY CLINIC | Age: 50
End: 2020-05-26

## 2020-07-06 RX ORDER — DILTIAZEM HYDROCHLORIDE 180 MG/1
180 CAPSULE, COATED, EXTENDED RELEASE ORAL DAILY
Qty: 90 CAPSULE | Refills: 3 | Status: SHIPPED | OUTPATIENT
Start: 2020-07-06

## 2020-07-06 RX ORDER — METOPROLOL SUCCINATE 50 MG/1
100 TABLET, EXTENDED RELEASE ORAL DAILY
Qty: 180 TABLET | Refills: 3 | Status: SHIPPED | OUTPATIENT
Start: 2020-07-06 | End: 2020-09-17 | Stop reason: SDUPTHER

## 2020-07-20 ENCOUNTER — TELEPHONE (OUTPATIENT)
Dept: CARDIOLOGY CLINIC | Age: 50
End: 2020-07-20

## 2020-07-20 NOTE — TELEPHONE ENCOUNTER
Patient called back.  He just received from [facility] Toprol 50 mg (take 2 tabs every morning.)

## 2020-07-20 NOTE — TELEPHONE ENCOUNTER
[facility] faxed over request to fill Toprol  mg daily instead of 50 mg 2 tab once a day. LM to confirm how is is taking medication.

## 2020-09-17 RX ORDER — METOPROLOL SUCCINATE 100 MG/1
100 TABLET, EXTENDED RELEASE ORAL DAILY
Qty: 90 TABLET | Refills: 1 | Status: SHIPPED | OUTPATIENT
Start: 2020-09-17

## 2020-09-17 NOTE — TELEPHONE ENCOUNTER
Pharmacy is requesting Metoprolol suc 50 mg 2 a day be changed to 100 mg daily for cost effectiveness. I have this pended.     LV 2/25/2020